# Patient Record
Sex: FEMALE | Race: BLACK OR AFRICAN AMERICAN | NOT HISPANIC OR LATINO | Employment: FULL TIME | URBAN - METROPOLITAN AREA
[De-identification: names, ages, dates, MRNs, and addresses within clinical notes are randomized per-mention and may not be internally consistent; named-entity substitution may affect disease eponyms.]

---

## 2018-05-21 ENCOUNTER — HOSPITAL ENCOUNTER (EMERGENCY)
Facility: HOSPITAL | Age: 28
Discharge: HOME | End: 2018-05-21
Attending: EMERGENCY MEDICINE

## 2018-05-21 VITALS
BODY MASS INDEX: 20.24 KG/M2 | TEMPERATURE: 98 F | HEIGHT: 62 IN | SYSTOLIC BLOOD PRESSURE: 106 MMHG | HEART RATE: 75 BPM | DIASTOLIC BLOOD PRESSURE: 63 MMHG | OXYGEN SATURATION: 97 % | RESPIRATION RATE: 16 BRPM | WEIGHT: 110 LBS

## 2018-05-21 DIAGNOSIS — R51.9 NONINTRACTABLE HEADACHE, UNSPECIFIED CHRONICITY PATTERN, UNSPECIFIED HEADACHE TYPE: Primary | ICD-10-CM

## 2018-05-21 LAB
B-HCG UR QL: NEGATIVE
CTP QC/QA: YES

## 2018-05-21 PROCEDURE — 99283 EMERGENCY DEPT VISIT LOW MDM: CPT

## 2018-05-21 PROCEDURE — 81025 URINE PREGNANCY TEST: CPT | Performed by: EMERGENCY MEDICINE

## 2018-05-21 NOTE — ED TRIAGE NOTES
Patient coming to ER for complaint of headache for 3 days.  Patient's name and date of birth checked and is correct.  LOC: The patient is awake, alert and aware of environment with an appropriate affect, the patient is oriented x 3 and speaking appropriately.  APPEARANCE: Patient resting comfortably and in no acute distress, patient is clean and well groomed, patient's clothing is properly fastened.  CARDIOVASCULAR:  Heart rate regular and even with no peripheral edema noted.  SKIN: The skin is warm and dry, patient has normal skin turgor and moist mucus membranes, skin intact, no breakdown or brusing noted.   MUSKULOSKELETAL: Patient moving all extremities well, no obvious swelling or deformities noted.  RESPIRATORY: Airway is open and patent, respirations are spontaneous, patient has a normal effort and rate.

## 2018-05-21 NOTE — DISCHARGE INSTRUCTIONS
Please take over-the-counter Excedrin for managing your headache. Please follow-up with a primary care provider for ongoing management of your headache. Please pay attention to whether your headaches are associated with certain foods or activities whenever they are occurring. Please hydrate by drinking plenty of water.     Our goal in the emergency department is to always give you outstanding care and exceptional service. You may receive a survey by mail or e-mail in the next week regarding your experience in our ED. We would greatly appreciate your completing and returning the survey. Your feedback provides us with a way to recognize our staff who give very good care and it helps us learn how to improve when your experience was below our aspiration of excellence.

## 2024-12-28 NOTE — ED PROVIDER NOTES
Encounter Date: 5/21/2018    SCRIBE #1 NOTE: I, Seda Rodriguez, am scribing for, and in the presence of,  Dr. Woodard. I have scribed the following portions of the note - the APC attestation.       History     Chief Complaint   Patient presents with    Headache     27 year old female with no reported medical history presenting to the ED with the chief complaint of a headache. She describes it as an intermittent aching sensation located in her right parietal area. She has had history of headaches like this previously but not as severe. No photosensitivity, floaters, band-like distribution, thunder-clap onset, or sinus pressure. She reports going to work today and it worsened and subsequently came to the ED for evaluation. She reports taking Tylenol and IBU with moderate relief at home. She reports she is tired of taking those medications and wants to try something else. Patient also requests a work excuse. She denies fever, neck stiffness, vision and speech changes, numbness, paresthesias, unilateral extremity weakness, chest pain, vomiting, urinary and bowel changes. No falls or head trauma.           Review of patient's allergies indicates:  No Known Allergies  History reviewed. No pertinent past medical history.  Past Surgical History:   Procedure Laterality Date    SALPINGECTOMY       Family History   Problem Relation Age of Onset    Cancer Mother     No Known Problems Father      Social History   Substance Use Topics    Smoking status: Never Smoker    Smokeless tobacco: Never Used    Alcohol use Yes      Comment: occa     Review of Systems   Constitutional: Negative for fever.   HENT: Negative for sore throat.    Eyes: Negative for photophobia, pain and visual disturbance.   Respiratory: Negative for shortness of breath.    Cardiovascular: Negative for chest pain.   Gastrointestinal: Negative for nausea.   Genitourinary: Negative for dysuria.   Musculoskeletal: Negative for back pain.   Skin: Negative for  rash.   Neurological: Positive for headaches. Negative for weakness.   Hematological: Does not bruise/bleed easily.       Physical Exam     Initial Vitals [05/21/18 1009]   BP Pulse Resp Temp SpO2   106/63 75 16 98.4 °F (36.9 °C) 97 %      MAP       77.33         Physical Exam    Constitutional: She appears well-developed and well-nourished. She is not diaphoretic. No distress.   HENT:   Head: Normocephalic and atraumatic.   Mouth/Throat: Oropharynx is clear and moist. No oropharyngeal exudate.   Eyes: EOM are normal. Pupils are equal, round, and reactive to light.   Neck: Normal range of motion. Neck supple.   Cardiovascular: Normal rate and regular rhythm.   Pulmonary/Chest: Breath sounds normal. No respiratory distress. She has no wheezes.   Abdominal: Soft. Bowel sounds are normal. There is no tenderness.   Musculoskeletal: Normal range of motion. She exhibits no edema or tenderness.   Neurological: She is alert and oriented to person, place, and time. She has normal strength. No cranial nerve deficit or sensory deficit.   Skin: Skin is warm and dry. No erythema.         ED Course   Procedures  Labs Reviewed   POCT URINE PREGNANCY             Medical Decision Making:   History:   Old Medical Records: I decided to obtain old medical records.       APC / Resident Notes:   27 year old female with no reported medical history presenting to the ED c/o headache for 3 days. Reports relief with Tylenol and IBU at home. DDx includes but not limited to migraine, tension headache, sinus headache, cluster headache. I have considered but do not suspect meningitis, subarachnoid hemorrhage, SDH.     Patient states she does not want to receive any medications for treating her headache while in the ED. Patient is AAOx3, non-toxic appearing, and neurovascularly intact. She reports she needs a work excuse for today. Will give work excuse. Advised patient to take Excedrin OTC and hydrate with plenty of water. Advised patient to  follow-up with her PCP for ongoing management of her headache. Patient given resources for establishing a PCP. Return to ED precautions given. All of the patient's questions were answered. I have discussed the care of this patient with my supervising physician.            Scribe Attestation:   Scribe #1: I performed the above scribed service and the documentation accurately describes the services I performed. I attest to the accuracy of the note.    Attending Attestation:     Physician Attestation Statement for NP/PA:   I discussed this assessment and plan of this patient with the NP/PA, but I did not personally examine the patient. The face to face encounter was performed by the NP/PA.                     Clinical Impression:   The encounter diagnosis was Nonintractable headache, unspecified chronicity pattern, unspecified headache type.    Disposition:   Disposition: Discharged  Condition: Stable                        Timoteo Vuaghan PA-C  05/21/18 9086     52